# Patient Record
Sex: MALE | Race: WHITE | NOT HISPANIC OR LATINO
[De-identification: names, ages, dates, MRNs, and addresses within clinical notes are randomized per-mention and may not be internally consistent; named-entity substitution may affect disease eponyms.]

---
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
## 2023-05-16 ENCOUNTER — APPOINTMENT (OUTPATIENT)
Dept: ORTHOPEDIC SURGERY | Facility: CLINIC | Age: 18
End: 2023-05-16
Payer: COMMERCIAL

## 2023-05-16 VITALS — HEIGHT: 72 IN | WEIGHT: 200 LBS | BODY MASS INDEX: 27.09 KG/M2

## 2023-05-16 DIAGNOSIS — S76.311A STRAIN OF MUSCLE, FASCIA AND TENDON OF THE POSTERIOR MUSCLE GROUP AT THIGH LEVEL, RIGHT THIGH, INITIAL ENCOUNTER: ICD-10-CM

## 2023-05-16 PROCEDURE — 99203 OFFICE O/P NEW LOW 30 MIN: CPT

## 2023-05-16 NOTE — PHYSICAL EXAM
[Right] : right hip [] : no pain with log-rolling of hip [FreeTextEntry8] : mid-proximal hamstring tenderness

## 2023-05-16 NOTE — HISTORY OF PRESENT ILLNESS
[Gradual] : gradual [7] : 7 [0] : 0 [Radiating] : radiating [Sharp] : sharp [Shooting] : shooting [Intermittent] : intermittent [Leisure] : leisure [Walking] : walking [Exercising] : exercising [de-identified] : 5/16/23: 16 yo male with right hamstring pain since 2020. He states he had injury while playing football at that time but no new injury. He states he has occasional flare-ups while running and playing sports. He reports tightness and pain for past 2 weeks. He has trouble extending his leg. There is cramping feeling posteriorly.  [] : no [FreeTextEntry1] : right hamstring [FreeTextEntry5] : LETTY is a 17 year old M who is here today for right hamstring injury. Patient initally injured hamstring a couple years ago playing football but never got treatment. 2 weeks ago patient reinjured hamstring playing football his hamstring tighten up and causes him to limp. Patient reports his muscle feels like it locks up on him. [FreeTextEntry7] : up quad [FreeTextEntry8] : sports [FreeTextEntry9] : stretching, elevating.

## 2023-05-16 NOTE — ASSESSMENT
[FreeTextEntry1] : 5/16/23: recurrent right hamstring strain\par Recommend starting PT.\par HEP for stretching.\par Activity modification.\par Follow up in 4-6 weeks.\par \par Progress note completed by GERRI Henriquez under the direct supervision of Brayan Mello M.D.\par Patient seen by Brayan Mello M.D.\par

## 2023-06-15 ENCOUNTER — APPOINTMENT (OUTPATIENT)
Dept: ORTHOPEDIC SURGERY | Facility: CLINIC | Age: 18
End: 2023-06-15

## 2024-11-26 ENCOUNTER — APPOINTMENT (OUTPATIENT)
Dept: GASTROENTEROLOGY | Facility: CLINIC | Age: 19
End: 2024-11-26
Payer: COMMERCIAL

## 2024-11-26 VITALS
HEART RATE: 81 BPM | WEIGHT: 208 LBS | DIASTOLIC BLOOD PRESSURE: 76 MMHG | SYSTOLIC BLOOD PRESSURE: 114 MMHG | HEIGHT: 72 IN | BODY MASS INDEX: 28.17 KG/M2 | OXYGEN SATURATION: 97 %

## 2024-11-26 DIAGNOSIS — R10.10 UPPER ABDOMINAL PAIN, UNSPECIFIED: ICD-10-CM

## 2024-11-26 DIAGNOSIS — K21.9 GASTRO-ESOPHAGEAL REFLUX DISEASE W/OUT ESOPHAGITIS: ICD-10-CM

## 2024-11-26 PROCEDURE — 99204 OFFICE O/P NEW MOD 45 MIN: CPT

## 2024-11-26 RX ORDER — FAMOTIDINE 40 MG/1
40 TABLET, FILM COATED ORAL
Qty: 30 | Refills: 3 | Status: ACTIVE | COMMUNITY
Start: 2024-11-26 | End: 1900-01-01

## 2024-11-26 RX ORDER — OMEPRAZOLE 20 MG/1
20 CAPSULE, DELAYED RELEASE ORAL TWICE DAILY
Qty: 60 | Refills: 5 | Status: ACTIVE | COMMUNITY
Start: 2024-11-26 | End: 1900-01-01

## 2025-01-08 ENCOUNTER — APPOINTMENT (OUTPATIENT)
Dept: INTERNAL MEDICINE | Facility: CLINIC | Age: 20
End: 2025-01-08